# Patient Record
Sex: FEMALE | Race: WHITE | NOT HISPANIC OR LATINO | Employment: UNEMPLOYED | RURAL
[De-identification: names, ages, dates, MRNs, and addresses within clinical notes are randomized per-mention and may not be internally consistent; named-entity substitution may affect disease eponyms.]

---

## 2017-03-09 ENCOUNTER — TELEPHONE (OUTPATIENT)
Dept: FAMILY MEDICINE CLINIC | Facility: CLINIC | Age: 27
End: 2017-03-09

## 2017-03-09 RX ORDER — AZITHROMYCIN 250 MG/1
TABLET, FILM COATED ORAL
Qty: 6 TABLET | Refills: 0 | Status: SHIPPED | OUTPATIENT
Start: 2017-03-09 | End: 2018-02-13

## 2017-03-09 NOTE — TELEPHONE ENCOUNTER
Pt called she has sore throat with colored nasal drainage she wants to know if u will call in meds [facility] [phone]

## 2017-09-28 ENCOUNTER — TELEPHONE (OUTPATIENT)
Dept: FAMILY MEDICINE CLINIC | Facility: CLINIC | Age: 27
End: 2017-09-28

## 2017-09-28 RX ORDER — AZITHROMYCIN 250 MG/1
TABLET, FILM COATED ORAL
Qty: 6 TABLET | Refills: 0 | Status: SHIPPED | OUTPATIENT
Start: 2017-09-28 | End: 2018-02-13

## 2017-10-25 ENCOUNTER — TELEPHONE (OUTPATIENT)
Dept: FAMILY MEDICINE CLINIC | Facility: CLINIC | Age: 27
End: 2017-10-25

## 2017-10-25 RX ORDER — AZITHROMYCIN 250 MG/1
TABLET, FILM COATED ORAL
Qty: 6 TABLET | Refills: 0 | Status: SHIPPED | OUTPATIENT
Start: 2017-10-25 | End: 2018-02-13

## 2017-10-25 NOTE — TELEPHONE ENCOUNTER
Pt called she has sore throat and swollen glands she wants to know if u will call in meds [facility] [phone]

## 2018-01-15 ENCOUNTER — TELEPHONE (OUTPATIENT)
Dept: FAMILY MEDICINE CLINIC | Facility: CLINIC | Age: 28
End: 2018-01-15

## 2018-01-15 RX ORDER — CLARITHROMYCIN 500 MG/1
500 TABLET, COATED ORAL EVERY 12 HOURS SCHEDULED
Qty: 20 TABLET | Refills: 0 | Status: SHIPPED | OUTPATIENT
Start: 2018-01-15 | End: 2018-01-25

## 2018-02-13 ENCOUNTER — TELEPHONE (OUTPATIENT)
Dept: FAMILY MEDICINE CLINIC | Facility: CLINIC | Age: 28
End: 2018-02-13

## 2018-02-13 RX ORDER — CLARITHROMYCIN 500 MG/1
500 TABLET, COATED ORAL 2 TIMES DAILY
Qty: 20 TABLET | Refills: 0 | Status: SHIPPED | OUTPATIENT
Start: 2018-02-13 | End: 2019-12-16

## 2018-02-13 NOTE — TELEPHONE ENCOUNTER
She says she has a right earache. Feels clogged. Can't hear out of it. Wants something sent to [facility]

## 2019-01-22 ENCOUNTER — TELEPHONE (OUTPATIENT)
Dept: FAMILY MEDICINE CLINIC | Facility: CLINIC | Age: 29
End: 2019-01-22

## 2019-01-22 RX ORDER — SULFAMETHOXAZOLE AND TRIMETHOPRIM 800; 160 MG/1; MG/1
1 TABLET ORAL 2 TIMES DAILY
Qty: 14 TABLET | Refills: 0 | Status: SHIPPED | OUTPATIENT
Start: 2019-01-22 | End: 2019-01-29

## 2019-01-22 NOTE — TELEPHONE ENCOUNTER
Roxann called & said that she has a nonproductive cough and chest congestion.  She asked if you could send an abx to [facility]--but the least expensive abx because she doesn't have insurance until 2/1/19.  [phone]

## 2019-03-06 ENCOUNTER — TELEPHONE (OUTPATIENT)
Dept: FAMILY MEDICINE CLINIC | Facility: CLINIC | Age: 29
End: 2019-03-06

## 2019-03-06 RX ORDER — AZITHROMYCIN 250 MG/1
TABLET, FILM COATED ORAL
Qty: 6 TABLET | Refills: 0 | Status: SHIPPED | OUTPATIENT
Start: 2019-03-06 | End: 2019-12-16

## 2019-03-06 NOTE — TELEPHONE ENCOUNTER
Pt called said that she has been sick since last week with a lot of nasal congestion and couging and thick mucus she asked if u will call in meds [facility] [phone]

## 2019-05-09 ENCOUNTER — TELEPHONE (OUTPATIENT)
Dept: FAMILY MEDICINE CLINIC | Facility: CLINIC | Age: 29
End: 2019-05-09

## 2019-05-09 RX ORDER — SULFAMETHOXAZOLE AND TRIMETHOPRIM 800; 160 MG/1; MG/1
1 TABLET ORAL 2 TIMES DAILY
Qty: 14 TABLET | Refills: 0 | Status: SHIPPED | OUTPATIENT
Start: 2019-05-09 | End: 2019-05-16

## 2019-05-09 NOTE — TELEPHONE ENCOUNTER
Pt called she thinks that she has a uti she has frequency urination and does not feel like she is emptying her bladder she wants to know if u will call her in some meds?[phone]

## 2019-10-10 ENCOUNTER — TELEPHONE (OUTPATIENT)
Dept: FAMILY MEDICINE CLINIC | Facility: CLINIC | Age: 29
End: 2019-10-10

## 2019-10-10 ENCOUNTER — OFFICE VISIT (OUTPATIENT)
Dept: FAMILY MEDICINE CLINIC | Facility: CLINIC | Age: 29
End: 2019-10-10

## 2019-10-10 VITALS
TEMPERATURE: 98.5 F | HEIGHT: 61 IN | OXYGEN SATURATION: 100 % | DIASTOLIC BLOOD PRESSURE: 82 MMHG | WEIGHT: 234 LBS | HEART RATE: 99 BPM | RESPIRATION RATE: 16 BRPM | SYSTOLIC BLOOD PRESSURE: 128 MMHG | BODY MASS INDEX: 44.18 KG/M2

## 2019-10-10 DIAGNOSIS — J01.10 ACUTE NON-RECURRENT FRONTAL SINUSITIS: Primary | ICD-10-CM

## 2019-10-10 DIAGNOSIS — Z96.22 PATENT PRESSURE EQUALIZATION (PE) TUBE, LEFT: ICD-10-CM

## 2019-10-10 PROCEDURE — 99213 OFFICE O/P EST LOW 20 MIN: CPT | Performed by: NURSE PRACTITIONER

## 2019-10-10 RX ORDER — NORGESTIMATE AND ETHINYL ESTRADIOL 0.25-0.035
KIT ORAL
Refills: 10 | COMMUNITY
Start: 2019-09-18 | End: 2023-02-22

## 2019-10-10 RX ORDER — CLINDAMYCIN HYDROCHLORIDE 300 MG/1
300 CAPSULE ORAL 3 TIMES DAILY
Qty: 30 CAPSULE | Refills: 0 | Status: SHIPPED | OUTPATIENT
Start: 2019-10-10 | End: 2019-12-16

## 2019-10-10 NOTE — TELEPHONE ENCOUNTER
Pt called she has bad ear inf she asked to be seen will u please call and get an appt with debi today [phone]

## 2019-10-10 NOTE — PROGRESS NOTES
SUBJECTIVE    Chief Complaint:  Sinus pressure, left ear pain    History of Present Illness:  This 29 y.o. female was seen in the office today for sinus pressure and left ear pain.  She reports having the pain start in the last 5 days.  She is already started using the antibiotic drops prescribed by her nose and throat doctor.  She has a history of a PE tube placement.  She does have pressure on the other side of the ear with nasal congestion as well.    Past Medical, Surgical, Social, and Family History:  History reviewed. No pertinent past medical history.  Past Surgical History:   Procedure Laterality Date   • TYMPANOSTOMY TUBE PLACEMENT Left      Social History     Socioeconomic History   • Marital status:      Spouse name: Not on file   • Number of children: Not on file   • Years of education: Not on file   • Highest education level: Not on file     History reviewed. No pertinent family history.  Review of Systems   Constitutional: Negative for chills, diaphoresis and fatigue.   HENT: Positive for congestion, ear pain, sinus pressure and sore throat. Negative for ear discharge and rhinorrhea.    Eyes: Negative for pain, discharge and itching.   Respiratory: Negative for cough, chest tightness, shortness of breath and wheezing.    Cardiovascular: Negative for chest pain and palpitations.   Gastrointestinal: Negative for abdominal distention and abdominal pain.   Endocrine: Negative for cold intolerance and heat intolerance.   Genitourinary: Negative for dysuria, flank pain, hematuria and urgency.   Musculoskeletal: Negative for arthralgias and myalgias.   Skin: Negative for rash, skin lesions and bruise.   Allergic/Immunologic: Negative for environmental allergies and food allergies.   Neurological: Negative for dizziness, speech difficulty and numbness.   Hematological: Negative for adenopathy. Does not bruise/bleed easily.   Psychiatric/Behavioral: Negative for agitation, behavioral problems,  "self-injury, suicidal ideas, depressed mood and stress. The patient is not nervous/anxious.      OBJECTIVE    Vital Signs:  /82 (BP Location: Left arm)   Pulse 99   Temp 98.5 °F (36.9 °C) (Tympanic)   Resp 16   Ht 154.9 cm (61\")   Wt 106 kg (234 lb)   SpO2 100%   BMI 44.21 kg/m²   Physical Exam   Constitutional: She is oriented to person, place, and time. No distress.   HENT:   Head: Normocephalic and atraumatic.   Right Ear: Hearing and tympanic membrane normal.   Left Ear: There is drainage (PE tube in place) and tenderness.   Nose: Right sinus exhibits frontal sinus tenderness. Left sinus exhibits frontal sinus tenderness.   Mouth/Throat: No oropharyngeal exudate.   Eyes: Conjunctivae and EOM are normal. Pupils are equal, round, and reactive to light.   Neck: Normal range of motion. Neck supple. No JVD present. No tracheal deviation present. No thyromegaly present.   Cardiovascular: Normal rate, regular rhythm, normal heart sounds and intact distal pulses. Exam reveals no gallop and no friction rub.   No murmur heard.  Pulmonary/Chest: Effort normal and breath sounds normal. No respiratory distress. She has no rales.   Abdominal: Soft. Bowel sounds are normal. She exhibits no distension and no mass. There is no tenderness. There is no rebound and no guarding.   Musculoskeletal: Normal range of motion. She exhibits no edema, tenderness or deformity.   Lymphadenopathy:     She has no cervical adenopathy.   Neurological: She is alert and oriented to person, place, and time.   Skin: Skin is warm and dry. Capillary refill takes less than 2 seconds. No rash noted. She is not diaphoretic.   Psychiatric: She has a normal mood and affect. Her behavior is normal. Judgment and thought content normal.   Nursing note and vitals reviewed.    ASSESSMENT/PLAN    Diagnoses and all orders for this visit:    1. Acute non-recurrent frontal sinusitis (Primary)  -     clindamycin (CLEOCIN) 300 MG capsule; Take 1 capsule " by mouth 3 (Three) Times a Day.  Dispense: 30 capsule; Refill: 0    2. Patent pressure equalization (PE) tube, left    Discussion:  Advised and educated plan of care.  F/u as needed with ENT, advised zyrtec-d OTC use as adjunct.    Follow-up:  Return if symptoms worsen or fail to improve.    Note e-Signed by NATHALY Reyes on 10/10/2019 at 3:07 PM

## 2019-12-16 RX ORDER — CIPROFLOXACIN 500 MG/1
500 TABLET, FILM COATED ORAL 2 TIMES DAILY
Qty: 28 TABLET | Refills: 0 | Status: SHIPPED | OUTPATIENT
Start: 2019-12-16 | End: 2019-12-30

## 2020-11-30 ENCOUNTER — OFFICE VISIT (OUTPATIENT)
Dept: FAMILY MEDICINE CLINIC | Facility: CLINIC | Age: 30
End: 2020-11-30

## 2020-11-30 VITALS
DIASTOLIC BLOOD PRESSURE: 80 MMHG | BODY MASS INDEX: 45.73 KG/M2 | SYSTOLIC BLOOD PRESSURE: 122 MMHG | RESPIRATION RATE: 16 BRPM | HEIGHT: 61 IN | TEMPERATURE: 97.7 F | HEART RATE: 113 BPM | WEIGHT: 242.2 LBS | OXYGEN SATURATION: 99 %

## 2020-11-30 DIAGNOSIS — Z02.89 ENCOUNTER FOR PHYSICAL EXAMINATION RELATED TO EMPLOYMENT: Primary | ICD-10-CM

## 2020-11-30 PROCEDURE — PEPHY: Performed by: FAMILY MEDICINE

## 2020-11-30 NOTE — PROGRESS NOTES
Subjective   Roxann Diaz is a 30 y.o. female presenting with chief complaint of:   Chief Complaint   Patient presents with   • [facility] physical       History of Present Illness :  Alone. Needs exam to work at [employer].  No personal history or exposure to international travel, HIV, hepatitis, TB, or Zika.       Has multiple chronic problems to consider that might have a bearing on today's issues; not an interval appointment.       Chronic/acute problems reviewed today:   1. Encounter for physical examination related to employment      Has an/another acute issue today: none.    The following portions of the patient's history were reviewed and updated as appropriate: allergies, current medications, past family history, past medical history, past social history, past surgical history and problem list.      Current Outpatient Medications:   •  ESTARYLLA 0.25-35 MG-MCG per tablet, , Disp: , Rfl: 10    No problems with medications.  Refills if needed done    Allergies   Allergen Reactions   • Penicillins        Review of Systems   Constitutional: Negative for activity change, appetite change, fatigue and fever.   HENT: Negative.    Eyes: Negative for discharge and visual disturbance.   Respiratory: Negative for cough, shortness of breath and wheezing.    Cardiovascular: Negative for chest pain, palpitations and leg swelling.   Gastrointestinal: Negative for abdominal pain, diarrhea and vomiting.   Genitourinary: Negative for dysuria.   Musculoskeletal: Negative for arthralgias and back pain.   Skin: Negative for rash and wound.   Psychiatric/Behavioral: Negative for dysphoric mood. The patient is not nervous/anxious.      See form    Lab Results:  No results found for this or any previous visit.    A1C:No results for input(s): HGBA1C in the last 24811 hours.  PSA:No results for input(s): PSA in the last 62319 hours.  CBC:No results for input(s): WBC, HGB, HCT, PLT, IRONSERUM, IRON in the last 59580 hours.  "  BMP/CMP:No results for input(s): NA, K, CL, CO2, GLU, BUN, CREATININE, EGFRIFNONA, EGFRIFAFRI, CALCIUM in the last 59956 hours.  HEPATIC:No results for input(s): ALT, AST, ALKPHOS, TOTAL in the last 53862 hours.    Invalid input(s): HEPATITSC  THYROID:No results for input(s): TSH, T3FREE, FTI in the last 03249 hours.    Invalid input(s): T4FREE, T3, T4, TEUP,  TOTALT4    Objective   /80   Pulse 113   Temp 97.7 °F (36.5 °C) (Infrared)   Resp 16   Ht 154.9 cm (61\")   Wt 110 kg (242 lb 3.2 oz)   LMP 10/25/2020 (Approximate)   SpO2 99%   Breastfeeding No   BMI 45.76 kg/m²   Body mass index is 45.76 kg/m².    Recent Vitals       10/10/2019 11/30/2020          BP:  128/82  122/80      Pulse:  99  113      Temp:  98.5 °F (36.9 °C)  97.7 °F (36.5 °C)      Weight:  106 kg (234 lb)  110 kg (242 lb 3.2 oz)      BMI (Calculated):  44.2  45.8            Physical Exam  Vitals signs and nursing note reviewed.   Constitutional:       General: She is not in acute distress.     Appearance: Normal appearance.   HENT:      Head: Normocephalic.      Right Ear: Tympanic membrane and ear canal normal.      Left Ear: Tympanic membrane and ear canal normal.      Nose: Nose normal.      Mouth/Throat:      Mouth: Mucous membranes are moist.   Eyes:      Conjunctiva/sclera: Conjunctivae normal.      Pupils: Pupils are equal, round, and reactive to light.   Neck:      Musculoskeletal: Normal range of motion.   Cardiovascular:      Rate and Rhythm: Normal rate and regular rhythm.   Pulmonary:      Effort: Pulmonary effort is normal.      Breath sounds: Normal breath sounds.   Abdominal:      Tenderness: There is no abdominal tenderness.   Musculoskeletal: Normal range of motion.         General: No swelling or tenderness.   Skin:     General: Skin is warm and dry.      Findings: No rash.   Neurological:      Mental Status: She is alert.   Psychiatric:         Mood and Affect: Mood normal.         Behavior: Behavior normal.       "   Thought Content: Thought content normal.         Judgment: Judgment normal.         Assessment/Plan     1. Encounter for physical examination related to employment        Discussions:   Report changes to your employer    Rx: reviewed/changes:  No orders of the defined types were placed in this encounter.      LAB/Testing/Referrals: reviewed/orders:   Today:   No orders of the defined types were placed in this encounter.    Chronic/recurrent labs above or change to:   Per Mirtha           There are no Patient Instructions on file for this visit.    Follow up: Return for dr mirtha weldon.  No future appointments.    Procedures none

## 2021-01-25 RX ORDER — METHYLPREDNISOLONE 4 MG/1
TABLET ORAL
Qty: 21 TABLET | Refills: 0 | Status: SHIPPED | OUTPATIENT
Start: 2021-01-25 | End: 2023-02-22

## 2021-01-25 NOTE — TELEPHONE ENCOUNTER
Caller: Roxann Diaz    Relationship to patient: Self    Best call back number: [phone]    Patient is needing:     Patient states she has a itchy rash that started 2 weeks ago. She states she has this all over her body and has tried to use calamine lotion, but it persists and is spreading. She thinks it may be poison ivy or sumac. She declined an appointment, but is wondering if something can be sent in for this ? Please advise.      [facility] - [place], [place] - [address] [place] [address] [phone] [facility] [phone] FX

## 2021-05-06 RX ORDER — CLARITHROMYCIN 500 MG/1
500 TABLET, COATED ORAL EVERY 12 HOURS SCHEDULED
Qty: 20 TABLET | Refills: 0 | Status: SHIPPED | OUTPATIENT
Start: 2021-05-06 | End: 2021-05-16

## 2021-05-06 NOTE — TELEPHONE ENCOUNTER
Caller: Roxann Diaz    Relationship: Self    Best call back number: [phone]    What medication are you requesting: ANTIBIOTICS    What are your current symptoms: CONGESTION, SINUS HEADACHE, YELLOW MUCUS, COUGH    How long have you been experiencing symptoms: SIX DAYS    Have you had these symptoms before:    [x] Yes  [] No    Have you been treated for these symptoms before:   [x] Yes  [] No    If a prescription is needed, what is your preferred pharmacy and phone number: [facility] - [place], [place] - [address] [phone] [facility] [phone]

## 2022-02-22 ENCOUNTER — TELEPHONE (OUTPATIENT)
Dept: FAMILY MEDICINE CLINIC | Facility: CLINIC | Age: 32
End: 2022-02-22

## 2022-02-22 DIAGNOSIS — R43.0 LOSS OF SMELL: ICD-10-CM

## 2022-02-22 DIAGNOSIS — R43.2 LOSS OF TASTE: Primary | ICD-10-CM

## 2022-02-22 NOTE — TELEPHONE ENCOUNTER
Caller: Roxann Diaz    Relationship: Self    Best call back number: [phone]    What orders are you requesting (i.e. lab or imaging): COVID-19 TEST    In what timeframe would the patient need to come in: ASAP    Where will you receive your lab/imaging services: [facility] [place]    Additional notes: PATIENT HAS LOST HER TASTE AND SMELL. HAD A FEVER Monday. ONE POSITIVE TEST, ONE NEGATIVE TEST. HEALTH DEPARTMENT REQUESTED A PCR. PLEASE ADVSE

## 2023-02-22 ENCOUNTER — OFFICE VISIT (OUTPATIENT)
Dept: FAMILY MEDICINE CLINIC | Facility: CLINIC | Age: 33
End: 2023-02-22

## 2023-02-22 VITALS
HEART RATE: 119 BPM | RESPIRATION RATE: 14 BRPM | WEIGHT: 249 LBS | BODY MASS INDEX: 47.01 KG/M2 | DIASTOLIC BLOOD PRESSURE: 88 MMHG | SYSTOLIC BLOOD PRESSURE: 146 MMHG | HEIGHT: 61 IN | OXYGEN SATURATION: 98 %

## 2023-02-22 DIAGNOSIS — Z02.89 ENCOUNTER FOR PHYSICAL EXAMINATION RELATED TO EMPLOYMENT: Primary | ICD-10-CM

## 2023-02-22 PROCEDURE — 99213 OFFICE O/P EST LOW 20 MIN: CPT | Performed by: FAMILY MEDICINE

## 2023-02-22 RX ORDER — ASPIRIN 81 MG/1
TABLET ORAL
COMMUNITY
Start: 2023-02-10

## 2023-02-22 NOTE — PROGRESS NOTES
"Subjective   Roxann Diaz is a 33 y.o. female presenting with chief complaint of:   Chief Complaint   Patient presents with   • Employment Physical     [employer] physical     History of Present Illness :  Alone.  Alone. Needs exam to work at [place] [address].  No personal history or exposure to international travel, HIV, hepatitis, TB, or Zika.  .[phone]    Has few chronic problems to consider that might have a bearing on today's issues; not an interval appointment.       Chronic/acute problems reviewed today:   1. Encounter for physical examination related to employment      Has an/another acute issue today: none.    The following portions of the patient's history were reviewed and updated as appropriate: allergies, current medications, past family history, past medical history, past social history, past surgical history and problem list.      Current Outpatient Medications:   •  aspirin (Aspirin Adult Low Dose) 81 MG EC tablet, , Disp: , Rfl:     No problems with medications.    Allergies   Allergen Reactions   • Penicillins        Review of Systems  See form  Denies open sores/wounds  Denies cough/wheeze  Denies diarrhea  Denies neck/back/joint pain/problem to limit current duties  Denies anxiety/depression to harm self/others      Objective   /88   Pulse 119   Resp 14   Ht 154.9 cm (61\")   Wt 113 kg (249 lb)   SpO2 98%   BMI 47.05 kg/m²   Body mass index is 47.05 kg/m².    Recent Vitals       10/10/2019 11/30/2020 2/22/2023       BP: 128/82 122/80 146/88     Pulse: 99 113 119     Temp: 98.5 °F (36.9 °C) 97.7 °F (36.5 °C) --     Weight: 106 kg (234 lb) 110 kg (242 lb 3.2 oz) 113 kg (249 lb)     BMI (Calculated): 44.2 45.8 47.1         Wt Readings from Last 15 Encounters:   02/22/23 1409 113 kg (249 lb)   11/30/20 1108 110 kg (242 lb 3.2 oz)   10/10/19 1450 106 kg (234 lb)       Physical Exam  Vitals and nursing note reviewed.   Constitutional:       General: She is not in acute " distress.     Appearance: She is obese.   HENT:      Head: Normocephalic and atraumatic.      Right Ear: Tympanic membrane, ear canal and external ear normal.      Left Ear: Tympanic membrane, ear canal and external ear normal.      Nose: Nose normal.      Mouth/Throat:      Mouth: Mucous membranes are moist.      Pharynx: Oropharynx is clear.   Eyes:      Extraocular Movements: Extraocular movements intact.      Conjunctiva/sclera: Conjunctivae normal.      Pupils: Pupils are equal, round, and reactive to light.   Cardiovascular:      Rate and Rhythm: Normal rate and regular rhythm.      Heart sounds: Normal heart sounds.   Pulmonary:      Effort: Pulmonary effort is normal.      Breath sounds: Normal breath sounds.   Abdominal:      Palpations: Abdomen is soft.      Tenderness: There is no abdominal tenderness.   Musculoskeletal:         General: No swelling, tenderness, deformity or signs of injury. Normal range of motion.      Cervical back: Neck supple. No tenderness.      Right lower leg: No edema.      Left lower leg: No edema.   Lymphadenopathy:      Cervical: No cervical adenopathy.   Skin:     General: Skin is warm and dry.      Findings: No lesion or rash.   Neurological:      Mental Status: She is alert and oriented to person, place, and time. Mental status is at baseline.       Assessment & Plan     1. Encounter for physical examination related to employment    acceptable exam for work duties; report however changes health to supervisor if should occur    Data review above:   Discussions/medical decisions/reviews:  BP advised too high; fortunately asymptomatic and needs to follow with OB since pregnant    Vaccines discussed for her work    Data review above:   Rx: reviewed and decisions:   Rx new/changes: none  No orders of the defined types were placed in this encounter.        There are no Patient Instructions on file for this visit.        Follow up: Return for copy; keep copy/scan and pt leaves with  origional.   return not expected.  No future appointments.

## 2023-03-14 ENCOUNTER — TELEPHONE (OUTPATIENT)
Dept: FAMILY MEDICINE CLINIC | Facility: CLINIC | Age: 33
End: 2023-03-14

## 2023-03-14 RX ORDER — AZITHROMYCIN 250 MG/1
TABLET, FILM COATED ORAL
Qty: 6 TABLET | Refills: 0 | Status: SHIPPED | OUTPATIENT
Start: 2023-03-14

## 2023-03-14 NOTE — TELEPHONE ENCOUNTER
Please address pt has been seeing dr lange for her health dept physical but not see you in a while

## 2023-03-14 NOTE — TELEPHONE ENCOUNTER
Caller: Roxann Diaz    Relationship: Self    Best call back number: [phone]    What medication are you requesting:ANTIBIOTIC    What are your current symptoms: GLANDS IN NECK ARE SWOLLEN, GREEN MUCUS, DRAINAGE, SORE THROAT    How long have you been experiencing symptoms: COUPLE OF DAYS    Have you had these symptoms before:    [] Yes  [x] No    Have you been treated for these symptoms before:   [] Yes  [x] No    If a prescription is needed, what is your preferred pharmacy and phone number:   [facility] - [place], [place] [phone] PH/ [phone] FX     Additional notes: PATIENT IS PREGNANT AND SAYS NEEDS ANTIBIOTICS THAT WON'T HARM BABY.

## 2025-03-14 ENCOUNTER — OFFICE VISIT (OUTPATIENT)
Dept: FAMILY MEDICINE CLINIC | Facility: CLINIC | Age: 35
End: 2025-03-14
Payer: COMMERCIAL

## 2025-03-14 VITALS
HEART RATE: 102 BPM | BODY MASS INDEX: 47.2 KG/M2 | OXYGEN SATURATION: 99 % | DIASTOLIC BLOOD PRESSURE: 82 MMHG | WEIGHT: 250 LBS | HEIGHT: 61 IN | SYSTOLIC BLOOD PRESSURE: 134 MMHG

## 2025-03-14 DIAGNOSIS — R35.0 URINARY FREQUENCY: Primary | ICD-10-CM

## 2025-03-14 DIAGNOSIS — N30.01 ACUTE CYSTITIS WITH HEMATURIA: ICD-10-CM

## 2025-03-14 LAB
BILIRUB BLD-MCNC: NEGATIVE MG/DL
CLARITY, POC: CLEAR
COLOR UR: YELLOW
GLUCOSE UR STRIP-MCNC: NEGATIVE MG/DL
KETONES UR QL: NEGATIVE
LEUKOCYTE EST, POC: ABNORMAL
NITRITE UR-MCNC: NEGATIVE MG/ML
PH UR: 5.5 [PH] (ref 5–8)
PROT UR STRIP-MCNC: NEGATIVE MG/DL
RBC # UR STRIP: ABNORMAL /UL
SP GR UR: 1.03 (ref 1–1.03)
UROBILINOGEN UR QL: ABNORMAL

## 2025-03-14 PROCEDURE — 99213 OFFICE O/P EST LOW 20 MIN: CPT | Performed by: NURSE PRACTITIONER

## 2025-03-14 PROCEDURE — 81003 URINALYSIS AUTO W/O SCOPE: CPT | Performed by: NURSE PRACTITIONER

## 2025-03-14 RX ORDER — NITROFURANTOIN 25; 75 MG/1; MG/1
100 CAPSULE ORAL 2 TIMES DAILY
Qty: 14 CAPSULE | Refills: 0 | Status: SHIPPED | OUTPATIENT
Start: 2025-03-14 | End: 2025-03-21

## 2025-03-14 RX ORDER — NORGESTIMATE AND ETHINYL ESTRADIOL 0.25-0.035
1 KIT ORAL DAILY
COMMUNITY
Start: 2025-03-06

## 2025-03-14 NOTE — PROGRESS NOTES
"Chief Complaint  Urinary Frequency (Lasting a month /With lower back pain )    Subjective      Roxann Diaz presents to [facility]  HPI: Patient is a 35 y.o. female who is here today with complaint of urinary urgency, urinary frequency, low back pain. Symptoms started approximately 1 month ago. Denies fever.     Objective   Vital Signs:  /82   Pulse 102   Ht 154.9 cm (61\")   Wt 113 kg (250 lb)   SpO2 99%   BMI 47.24 kg/m²   Estimated body mass index is 47.24 kg/m² as calculated from the following:    Height as of this encounter: 154.9 cm (61\").    Weight as of this encounter: 113 kg (250 lb).          Physical Exam  Constitutional:       Appearance: Normal appearance.   Cardiovascular:      Rate and Rhythm: Normal rate and regular rhythm.   Pulmonary:      Effort: Pulmonary effort is normal.      Breath sounds: Normal breath sounds.   Abdominal:      General: Bowel sounds are normal.      Palpations: Abdomen is soft.      Tenderness: There is no abdominal tenderness. There is no right CVA tenderness or left CVA tenderness.   Neurological:      Mental Status: She is alert.   Psychiatric:         Mood and Affect: Mood normal.        Result Review :  The following labs/imaging/notes were reviewed and discussed with the patient by NATHALY Roa on 03/14/2025:             Assessment and Plan   Diagnoses and all orders for this visit:    1. Urinary frequency (Primary)  -     POC Urinalysis Dipstick, Multipro  -     nitrofurantoin, macrocrystal-monohydrate, (Macrobid) 100 MG capsule; Take 1 capsule by mouth 2 (Two) Times a Day for 7 days.  Dispense: 14 capsule; Refill: 0    2. Acute cystitis with hematuria  -     nitrofurantoin, macrocrystal-monohydrate, (Macrobid) 100 MG capsule; Take 1 capsule by mouth 2 (Two) Times a Day for 7 days.  Dispense: 14 capsule; Refill: 0  -     Urine Culture - , Urine, Clean Catch      I have sent urine for culture. Macrobid sent, if " culture shows this is resistant I will send different antibiotic. Take as directed. Increase water intake. Always wipe front to back, void after sexual intercourse, avoid products with fragrance to vaginal region. If symptoms persist or worsen, notify clinic.          Follow Up  No follow-ups on file.  Patient was given instructions and counseling regarding her condition or for health maintenance advice. Please see specific information pulled into the AVS if appropriate.

## 2025-03-18 LAB
BACTERIA UR CULT: ABNORMAL
BACTERIA UR CULT: ABNORMAL
OTHER ANTIBIOTIC SUSC ISLT: ABNORMAL